# Patient Record
Sex: MALE | Race: WHITE | Employment: FULL TIME | URBAN - METROPOLITAN AREA
[De-identification: names, ages, dates, MRNs, and addresses within clinical notes are randomized per-mention and may not be internally consistent; named-entity substitution may affect disease eponyms.]

---

## 2022-04-18 ENCOUNTER — HOSPITAL ENCOUNTER (EMERGENCY)
Age: 22
Discharge: HOME | End: 2022-04-18
Attending: STUDENT IN AN ORGANIZED HEALTH CARE EDUCATION/TRAINING PROGRAM
Payer: OTHER GOVERNMENT

## 2022-04-18 VITALS
WEIGHT: 128 LBS | BODY MASS INDEX: 20.57 KG/M2 | HEART RATE: 93 BPM | TEMPERATURE: 98.9 F | OXYGEN SATURATION: 99 % | HEIGHT: 66 IN | DIASTOLIC BLOOD PRESSURE: 90 MMHG | RESPIRATION RATE: 18 BRPM | SYSTOLIC BLOOD PRESSURE: 145 MMHG

## 2022-04-18 DIAGNOSIS — N50.812 TESTICULAR PAIN, LEFT: ICD-10-CM

## 2022-04-18 DIAGNOSIS — R31.9 HEMATURIA, UNSPECIFIED TYPE: Primary | ICD-10-CM

## 2022-04-18 LAB
APPEARANCE UR: CLEAR
BACTERIA URNS QL MICRO: NEGATIVE /HPF
BILIRUB UR QL: NEGATIVE
COLOR UR: YELLOW
GLUCOSE UR STRIP.AUTO-MCNC: NEGATIVE MG/DL
HGB UR QL STRIP: ABNORMAL
KETONES UR QL STRIP.AUTO: NEGATIVE MG/DL
LEUKOCYTE ESTERASE UR QL STRIP.AUTO: NEGATIVE
NITRITE UR QL STRIP.AUTO: NEGATIVE
PH UR STRIP: 7 [PH] (ref 5–8)
PROT UR STRIP-MCNC: NEGATIVE MG/DL
RBC #/AREA URNS HPF: NORMAL /HPF (ref 0–5)
SP GR UR REFRACTOMETRY: 1.01 (ref 1–1.03)
UROBILINOGEN UR QL STRIP.AUTO: 0.1 EU/DL (ref 0.2–1)
WBC URNS QL MICRO: NORMAL /HPF (ref 0–4)

## 2022-04-18 PROCEDURE — 81001 URINALYSIS AUTO W/SCOPE: CPT

## 2022-04-18 PROCEDURE — 99283 EMERGENCY DEPT VISIT LOW MDM: CPT

## 2022-04-18 RX ORDER — CIPROFLOXACIN 500 MG/1
500 TABLET ORAL 2 TIMES DAILY
Qty: 10 TABLET | Refills: 0 | Status: SHIPPED | OUTPATIENT
Start: 2022-04-18 | End: 2022-04-23

## 2022-04-18 NOTE — ED TRIAGE NOTES
Pt c/o pain and inflammation to left testicle x2 days, pt noticed blood in urine this morning but states it is now clear    Hx back surgery 11/11/21 for sciatic pain L5 S1 microdiscectomy

## 2022-04-19 NOTE — ED PROVIDER NOTES
EMERGENCY DEPARTMENT HISTORY AND PHYSICAL EXAM      Date: 4/18/2022  Patient Name: Lenora Hernandez    History of Presenting Illness     Chief Complaint   Patient presents with    Groin Pain    Blood in Urine       History Provided By: Patient    HPI: Lenora Hernandez, 24 y.o. male with a past medical history significant No significant past medical history presents to the ED with cc of left sided testicular pain, dark urine today. Patient states over the last 2 days has had intermittent pain in the left groin/testicle area. Describes as aching, constant on \"top of testicle\" no radiation of pain, no fevers chills no nausea vomiting no trauma to area. Today patient noticed dark brown urine. Denies any penile discharge, denies any recent sexual contact. No abdominal pain nausea or vomiting no fevers chills. Denies any flank pain    There are no other complaints, changes, or physical findings at this time. PCP: Shirley, MD Kit        Past History     Past Medical History:  Past Medical History:   Diagnosis Date    Lumbar herniated disc        Past Surgical History:  Past Surgical History:   Procedure Laterality Date    HX LUMBAR DISKECTOMY  11/11/2021       Family History:  No family history on file. Social History:  Social History     Tobacco Use    Smoking status: Never Smoker    Smokeless tobacco: Never Used   Substance Use Topics    Alcohol use: Yes    Drug use: Not Currently       Allergies:  No Known Allergies      Review of Systems     Review of Systems   Constitutional: Negative for activity change, appetite change, chills and fever. HENT: Negative for congestion, sore throat and trouble swallowing. Eyes: Negative for photophobia and visual disturbance. Respiratory: Negative for cough, chest tightness and shortness of breath. Cardiovascular: Negative for chest pain and palpitations. Gastrointestinal: Negative for abdominal pain and nausea.    Genitourinary: Positive for dysuria, hematuria and testicular pain. Negative for decreased urine volume, flank pain and urgency. Musculoskeletal: Negative for arthralgias and neck pain. Skin: Negative for color change and pallor. Neurological: Negative for dizziness, weakness, numbness and headaches. Physical Exam     Physical Exam  Vitals and nursing note reviewed. Constitutional:       Appearance: Normal appearance. He is normal weight. HENT:      Head: Normocephalic and atraumatic. Nose: Nose normal.      Mouth/Throat:      Mouth: Mucous membranes are moist.   Eyes:      Extraocular Movements: Extraocular movements intact. Pupils: Pupils are equal, round, and reactive to light. Cardiovascular:      Rate and Rhythm: Normal rate and regular rhythm. Pulses: Normal pulses. Heart sounds: Normal heart sounds. Pulmonary:      Effort: Pulmonary effort is normal.      Breath sounds: Normal breath sounds. Abdominal:      General: Abdomen is flat. Bowel sounds are normal.      Palpations: Abdomen is soft. Tenderness: There is no right CVA tenderness or left CVA tenderness. Genitourinary:     Penis: Normal and circumcised. Epididymis:      Left: Not inflamed or enlarged. No mass or tenderness. Comments: Left testicle soft, nontender to palpation no swelling, no significant inguinal lymphadenopathy noted, normal lie  Musculoskeletal:         General: No swelling or tenderness. Normal range of motion. Cervical back: Normal range of motion and neck supple. Skin:     General: Skin is warm and dry. Capillary Refill: Capillary refill takes less than 2 seconds. Neurological:      General: No focal deficit present. Mental Status: He is alert and oriented to person, place, and time. Cranial Nerves: No cranial nerve deficit. Sensory: No sensory deficit. Motor: No weakness.    Psychiatric:         Mood and Affect: Mood normal.         Behavior: Behavior normal.         Diagnostic Study Results     Labs -     Recent Results (from the past 12 hour(s))   URINALYSIS W/ RFLX MICROSCOPIC    Collection Time: 04/18/22  8:20 PM   Result Value Ref Range    Color Yellow      Appearance Clear Clear      Specific gravity 1.010 1.003 - 1.030      pH (UA) 7.0 5.0 - 8.0      Protein Negative Negative mg/dL    Glucose Negative Negative mg/dL    Ketone Negative Negative mg/dL    Bilirubin Negative Negative      Blood Large (A) Negative      Urobilinogen 0.1 (L) 0.2 - 1.0 EU/dL    Nitrites Negative Negative      Leukocyte Esterase Negative Negative     URINE MICROSCOPIC    Collection Time: 04/18/22  8:20 PM   Result Value Ref Range    WBC 0-4 0 - 4 /hpf    RBC 20-50 0 - 5 /hpf    Bacteria Negative Negative /hpf       Radiologic Studies -   [unfilled]  CT Results  (Last 48 hours)    None        CXR Results  (Last 48 hours)    None          Medical Decision Making and ED Course   I am the first provider for this patient. I reviewed the vital signs, available nursing notes, past medical history, past surgical history, family history and social history. Vital Signs-Reviewed the patient's vital signs. Patient Vitals for the past 12 hrs:   Temp Pulse Resp BP SpO2   04/18/22 1955 98.9 °F (37.2 °C) 93 18 (!) 145/90 99 %         Records Reviewed: Nursing Notes    The patient presents with dysuria, blood in urine with a differential diagnosis of epididymitis, UTI, orchitis, renal stone,      Provider Notes (Medical Decision Making):     MDM   43-year-old male, no significant past medical history presents emergency department for 3 days of left-sided testicular pain, noted some blood in urine today. Physical exam shows well-appearing male, no distress, no flank pain no abdominal pain, normal  exam no significant testicular swelling or tenderness, no lymphadenopathy. Will order urinalysis, patient denies any recent sexual contact, no flank pain or abdominal pain do not suspect renal stone.   No suspicion for torsion given history of presenting symptoms and physical findings. ED Course:   Initial assessment performed. The patients presenting problems have been discussed, and they are in agreement with the care plan formulated and outlined with them. I have encouraged them to ask questions as they arise throughout their visit. ED Course as of 04/18/22 2310   Mon Apr 18, 2022 2054 UA shows large blood, no bacteria no nitrites. Given pain in testicle area with hematuria we will treat for epididymitis/orchitis, discharge patient home with prescription for Cipro. Will provide patient with urology follow-up instructed to follow-up if symptoms do not resolve in the next week, return to emergency department if pain increases increased testicular swelling, fevers chills nausea vomiting. [PZ]      ED Course User Index  [PZ] Geovanna Araujo MD         Procedures       Rizwana Grier MD  Procedures                     Disposition       Discharged    Remove if not discharged  DISCHARGE PLAN:  1. There are no discharge medications for this patient. 2.   Follow-up Information     Follow up With Specialties Details Why Dawn Fields MD Urology In 1 week If symptoms worsen [address] [place] [address]  [phone]          3. Return to ED if worse     Diagnosis     Clinical Impression:   1. Hematuria, unspecified type    2. Testicular pain, left        Attestations:    Rizwana Grier MD    Please note that this dictation was completed with [id number], the computer voice recognition software. Quite often unanticipated grammatical, syntax, homophones, and other interpretive errors are inadvertently transcribed by the computer software. Please disregard these errors. Please excuse any errors that have escaped final proofreading. Thank you.